# Patient Record
(demographics unavailable — no encounter records)

---

## 2024-12-11 NOTE — DISCUSSION/SUMMARY
[Essential Hypertension] : essential hypertension [Not Responding to Treatment] : not responding to treatment [EKG obtained to assist in diagnosis and management of assessed problem(s)] : EKG obtained to assist in diagnosis and management of assessed problem(s)

## 2024-12-13 NOTE — ASSESSMENT
[FreeTextEntry1] : We discussed a theoretic disadvantage to beta blocker with recent allergy and concern over bronchospasm. We will taper off of the Labetol and consider doxazosin.  5/30/2024 Isolated blood pressure elevation noted we discussed a aortic velocity of 3.2 m/s and a repeat echo was requested in order to reassess the velocity across aortic valve we discussed the threshold of 4 m/s in terms of the severity we also discussed the association between aortic stenosis and coronary artery disease and ischemic evaluation may be considered especially as plans were made for left knee replacement  9/10/24 we discussed the rationale for periodic transthoracic echo in order to allow for evaluation of a bioprosthetic aortic valve. in the meantime antibiotic prophylaxis is mandatory for dental procedures and prescribed for Ross. resume labetalol for blood pressure reduction. he will call if blood pressure greater than 150 systolic.    12/10/2024 Reviewed recommendations with respect to a prosthetic aortic valve including dental prophylaxis otherwise no new recommendations. May undergo endoscopy at class II anesthesia risk.   Medical necessity There is an intermediate risk encounter based upon drug evaluation and management and multiple cardiovascular issues including aortic stenosis ischemic heart disease and preoperative evaluation

## 2024-12-13 NOTE — PHYSICAL EXAM
[General Appearance - Well Developed] : well developed [Normal Appearance] : normal appearance [Well Groomed] : well groomed [General Appearance - Well Nourished] : well nourished [No Deformities] : no deformities [General Appearance - In No Acute Distress] : no acute distress [Normal Conjunctiva] : the conjunctiva exhibited no abnormalities [Eyelids - No Xanthelasma] : the eyelids demonstrated no xanthelasmas [Normal Oral Mucosa] : normal oral mucosa [No Oral Pallor] : no oral pallor [No Oral Cyanosis] : no oral cyanosis [Normal Jugular Venous A Waves Present] : normal jugular venous A waves present [Normal Jugular Venous V Waves Present] : normal jugular venous V waves present [No Jugular Venous Granados A Waves] : no jugular venous granados A waves [Respiration, Rhythm And Depth] : normal respiratory rhythm and effort [Exaggerated Use Of Accessory Muscles For Inspiration] : no accessory muscle use [Auscultation Breath Sounds / Voice Sounds] : lungs were clear to auscultation bilaterally [Heart Rate And Rhythm] : heart rate and rhythm were normal [Heart Sounds] : normal S1 and S2 [Murmurs] : no murmurs present [Abdomen Soft] : soft [Abdomen Tenderness] : non-tender [Abdomen Mass (___ Cm)] : no abdominal mass palpated [Abnormal Walk] : normal gait [Gait - Sufficient For Exercise Testing] : the gait was sufficient for exercise testing [Nail Clubbing] : no clubbing of the fingernails [Cyanosis, Localized] : no localized cyanosis [Petechial Hemorrhages (___cm)] : no petechial hemorrhages [Skin Color & Pigmentation] : normal skin color and pigmentation [] : no rash [No Venous Stasis] : no venous stasis [Skin Lesions] : no skin lesions [No Skin Ulcers] : no skin ulcer [No Xanthoma] : no  xanthoma was observed [Oriented To Time, Place, And Person] : oriented to person, place, and time [Affect] : the affect was normal [Mood] : the mood was normal [No Anxiety] : not feeling anxious [FreeTextEntry1] : moderate to severely obese

## 2024-12-13 NOTE — REASON FOR VISIT
[Follow-Up - Clinic] : a clinic follow-up of [Hypertension] : hypertension [Medication Management] : Medication management [FreeTextEntry1] : anaphylaxis

## 2025-01-20 NOTE — PHYSICAL EXAM
[General Appearance - Well Developed] : well developed [Normal Appearance] : normal appearance [Well Groomed] : well groomed [General Appearance - Well Nourished] : well nourished [No Deformities] : no deformities [General Appearance - In No Acute Distress] : no acute distress [FreeTextEntry1] : moderate to severely obese [Normal Conjunctiva] : the conjunctiva exhibited no abnormalities [Eyelids - No Xanthelasma] : the eyelids demonstrated no xanthelasmas [Normal Oral Mucosa] : normal oral mucosa [No Oral Pallor] : no oral pallor [No Oral Cyanosis] : no oral cyanosis [Normal Jugular Venous A Waves Present] : normal jugular venous A waves present [Normal Jugular Venous V Waves Present] : normal jugular venous V waves present [No Jugular Venous Granados A Waves] : no jugular venous granados A waves [Respiration, Rhythm And Depth] : normal respiratory rhythm and effort [Exaggerated Use Of Accessory Muscles For Inspiration] : no accessory muscle use [Auscultation Breath Sounds / Voice Sounds] : lungs were clear to auscultation bilaterally [Heart Rate And Rhythm] : heart rate and rhythm were normal [Heart Sounds] : normal S1 and S2 [Murmurs] : no murmurs present [Abdomen Soft] : soft [Abdomen Tenderness] : non-tender [Abdomen Mass (___ Cm)] : no abdominal mass palpated [Abnormal Walk] : normal gait [Gait - Sufficient For Exercise Testing] : the gait was sufficient for exercise testing [Nail Clubbing] : no clubbing of the fingernails [Cyanosis, Localized] : no localized cyanosis [Petechial Hemorrhages (___cm)] : no petechial hemorrhages [Skin Color & Pigmentation] : normal skin color and pigmentation [] : no rash [No Venous Stasis] : no venous stasis [Skin Lesions] : no skin lesions [No Skin Ulcers] : no skin ulcer [No Xanthoma] : no  xanthoma was observed [Oriented To Time, Place, And Person] : oriented to person, place, and time [Affect] : the affect was normal [Mood] : the mood was normal [No Anxiety] : not feeling anxious

## 2025-01-20 NOTE — ASSESSMENT
[FreeTextEntry1] : We discussed a theoretic disadvantage to beta blocker with recent allergy and concern over bronchospasm. We will taper off of the Labetol and consider doxazosin.  5/30/2024 Isolated blood pressure elevation noted we discussed a aortic velocity of 3.2 m/s and a repeat echo was requested in order to reassess the velocity across aortic valve we discussed the threshold of 4 m/s in terms of the severity we also discussed the association between aortic stenosis and coronary artery disease and ischemic evaluation may be considered especially as plans were made for left knee replacement  9/10/24 we discussed the rationale for periodic transthoracic echo in order to allow for evaluation of a bioprosthetic aortic valve. in the meantime antibiotic prophylaxis is mandatory for dental procedures and prescribed for Ross. resume labetalol for blood pressure reduction. he will call if blood pressure greater than 150 systolic.    12/10/2024 Reviewed recommendations with respect to a prosthetic aortic valve including dental prophylaxis otherwise no new recommendations. May undergo endoscopy at class II anesthesia risk.   1/17/25 we discussed a target blood pressure one 3080 I will gradually increase the labetalol as follows 200 mg 400 mg 600 mg total milligrams per day based upon clinical response with target of <150 systolic hold BP 90 systolic it is possible that with release of force after tAVR may have resulting in clinical hypertension.   Medical necessity There is an intermediate risk encounter based upon drug evaluation and management and multiple cardiovascular issues including aortic stenosis ischemic heart disease and preoperative evaluation

## 2025-02-07 NOTE — ASSESSMENT
[FreeTextEntry1] : We discussed a theoretic disadvantage to beta blocker with recent allergy and concern over bronchospasm. We will taper off of the Labetol and consider doxazosin.  5/30/2024 Isolated blood pressure elevation noted we discussed a aortic velocity of 3.2 m/s and a repeat echo was requested in order to reassess the velocity across aortic valve we discussed the threshold of 4 m/s in terms of the severity we also discussed the association between aortic stenosis and coronary artery disease and ischemic evaluation may be considered especially as plans were made for left knee replacement  9/10/24 we discussed the rationale for periodic transthoracic echo in order to allow for evaluation of a bioprosthetic aortic valve. in the meantime antibiotic prophylaxis is mandatory for dental procedures and prescribed for Ross. resume labetalol for blood pressure reduction. he will call if blood pressure greater than 150 systolic.    12/10/2024 Reviewed recommendations with respect to a prosthetic aortic valve including dental prophylaxis otherwise no new recommendations. May undergo endoscopy at class II anesthesia risk.   1/17/25 we discussed a target blood pressure one 3080 I will gradually increase the labetalol as follows 200 mg 400 mg 600 mg total milligrams per day based upon clinical response with target of <150 systolic hold BP 90 systolic it is possible that with release of force after tAVR may have resulting in clinical hypertension.   2/7/2025 Maintain labetalol 200/200 monitor blood pressure EKG stable  ZIO monitor hooked up today for 14 days to rule out atrial fibrillation  Medical necessity There is an intermediate risk encounter based upon drug evaluation and management and multiple cardiovascular issues including aortic stenosis and palpitation postoperative

## 2025-07-24 NOTE — DISCUSSION/SUMMARY
[Essential Hypertension] : essential hypertension [Not Responding to Treatment] : not responding to treatment [Procedure Intermediate Risk] : the procedure risk is intermediate [Optimized for Surgery] : the patient is optimized for surgery [FreeTextEntry1] : Without evidence or recent infarction overt heart failure or unstable angina and based upon a satisfactory EKG and clinical exam, Mr. Mora may undergo planned GED endoscopy and colonoscopy which constitute low risk procedure in a patient with intermediate cardiac risk at an ASA class II or III anesthesia risk. Antibiotic prophylaxis for GI procedures are discretionary from a cardiac standpoint although not strictly required based upon recent guidelines.    Medical necessity This is a high encounter based upon two or more chronic illnesses with mild exacerbation requiring further management and evaluation.    EKG is indicated for evaluation of preop evaluation  risks benefits alternatives were discussed with the patient. all questions were answered to his satisfaction.